# Patient Record
Sex: MALE | Race: WHITE | URBAN - METROPOLITAN AREA
[De-identification: names, ages, dates, MRNs, and addresses within clinical notes are randomized per-mention and may not be internally consistent; named-entity substitution may affect disease eponyms.]

---

## 2024-08-12 ENCOUNTER — TELEHEALTH PROVIDED OTHER THAN IN PATIENT'S HOME (OUTPATIENT)
Dept: URBAN - METROPOLITAN AREA TELEHEALTH 12 | Facility: TELEHEALTH | Age: 34
End: 2024-08-12
Payer: COMMERCIAL

## 2024-08-12 VITALS — HEIGHT: 67 IN | WEIGHT: 217 LBS

## 2024-08-12 DIAGNOSIS — E66.9 OBESITY, UNSPECIFIED: ICD-10-CM

## 2024-08-12 DIAGNOSIS — R10.11 RIGHT UPPER QUADRANT PAIN: ICD-10-CM

## 2024-08-12 DIAGNOSIS — K76.0 FATTY (CHANGE OF) LIVER, NOT ELSEWHERE CLASSIFIED: ICD-10-CM

## 2024-08-12 PROCEDURE — 99204 OFFICE O/P NEW MOD 45 MIN: CPT | Mod: 95 | Performed by: INTERNAL MEDICINE

## 2024-08-12 NOTE — SERVICEHPINOTES
PATIENT VERIFIED BY DATE OF BIRTH AND NAME. Patient has been consented for this telecommunication visit using [employer] application.   32 yo m  who had RUQ for several months.  He stopped drinking celsius and his pain disappeared. He saw his PCP who ordered an US and bloodwork.  CBC and CMP normal, and US showed enlarged fatty liver.

## 2024-08-12 NOTE — SERVICENOTES
Patient was located at their place of work during visit., I spent 20 minutes today reviewing the chart, talking with the patient, reviewing previous results with patient, discussing plan, and documenting. Patient understands and agrees with plan. Questions/concerns addressed., I spent 20 minutes today reviewing the chart, talking with the patient, reviewing previous results with patient, discussing plan, and documenting. Patient understands and agrees with plan. Questions/concerns addressed.